# Patient Record
Sex: MALE | Race: WHITE | Employment: FULL TIME | URBAN - METROPOLITAN AREA
[De-identification: names, ages, dates, MRNs, and addresses within clinical notes are randomized per-mention and may not be internally consistent; named-entity substitution may affect disease eponyms.]

---

## 2017-12-11 PROBLEM — K62.5 RECTAL BLEEDING: Status: ACTIVE | Noted: 2017-12-11

## 2017-12-13 PROBLEM — M54.9 BACK PAIN: Status: ACTIVE | Noted: 2017-12-13

## 2017-12-30 PROCEDURE — 83520 IMMUNOASSAY QUANT NOS NONAB: CPT | Performed by: SURGERY

## 2017-12-30 PROCEDURE — 86255 FLUORESCENT ANTIBODY SCREEN: CPT | Performed by: SURGERY

## 2017-12-30 PROCEDURE — 86256 FLUORESCENT ANTIBODY TITER: CPT | Performed by: SURGERY

## 2017-12-30 PROCEDURE — 36415 COLL VENOUS BLD VENIPUNCTURE: CPT | Performed by: SURGERY

## 2018-01-09 ENCOUNTER — OFFICE VISIT (OUTPATIENT)
Dept: PHYSICAL THERAPY | Age: 43
End: 2018-01-09
Attending: PHYSICAL MEDICINE & REHABILITATION
Payer: OTHER MISCELLANEOUS

## 2018-01-09 DIAGNOSIS — M54.50 LOW BACK PAIN AT MULTIPLE SITES: ICD-10-CM

## 2018-01-09 PROCEDURE — 97161 PT EVAL LOW COMPLEX 20 MIN: CPT | Performed by: PHYSICAL THERAPIST

## 2018-01-09 PROCEDURE — 97530 THERAPEUTIC ACTIVITIES: CPT | Performed by: PHYSICAL THERAPIST

## 2018-01-09 NOTE — PROGRESS NOTES
SPINE EVALUATION:   Referring Physician: Dr. Dada Lundberg  Diagnosis: low back pain     Date of Service: 1/9/2018     PATIENT Max Lynne is a 40year old y/o male who presents to therapy today with complaints of low back pain s/p injury when work the above impairments for return to prior level of functioning without limitations. Thank you for referring Deedee Son to [id number].     Precautions:  None  OBJECTIVE:   Observation/Posture: Decreased lumbar lordosis in standing  Gait: Decreased ar visits)  · Pt will improve lumbar spine AROM flexion to min limitations to allow increase ease with bending forward to don shoes (8 visits)  · Pt will report improved symptom centralization and absence of radicular symptoms for 3 consecutive days to improv

## 2018-01-11 ENCOUNTER — OFFICE VISIT (OUTPATIENT)
Dept: PHYSICAL THERAPY | Age: 43
End: 2018-01-11
Attending: PHYSICAL MEDICINE & REHABILITATION
Payer: OTHER MISCELLANEOUS

## 2018-01-11 PROCEDURE — 97530 THERAPEUTIC ACTIVITIES: CPT | Performed by: PHYSICAL THERAPIST

## 2018-01-11 PROCEDURE — 97112 NEUROMUSCULAR REEDUCATION: CPT | Performed by: PHYSICAL THERAPIST

## 2018-01-11 PROCEDURE — 97140 MANUAL THERAPY 1/> REGIONS: CPT | Performed by: PHYSICAL THERAPIST

## 2018-01-11 NOTE — PROGRESS NOTES
Dx: low back pain         Authorized # of Visits:  8         Next MD visit: none scheduled  Fall Risk: standard         Precautions: n/a             Subjective: Pt states he felt ok after PT but had a lot more sharp shooting pain in L post thigh when arlin Supine TA brace X 10         Knee fall out with TA brace X 10         Pelvic tilts to assume neutral spine         Man there: STM to lumbar paraspinals X 10 min         Pt, , education on POC X 15 min                                        Ch

## 2018-01-16 ENCOUNTER — OFFICE VISIT (OUTPATIENT)
Dept: PHYSICAL THERAPY | Age: 43
End: 2018-01-16
Attending: PHYSICAL MEDICINE & REHABILITATION
Payer: OTHER MISCELLANEOUS

## 2018-01-16 PROCEDURE — 97110 THERAPEUTIC EXERCISES: CPT | Performed by: PHYSICAL THERAPIST

## 2018-01-16 PROCEDURE — 97140 MANUAL THERAPY 1/> REGIONS: CPT | Performed by: PHYSICAL THERAPIST

## 2018-01-16 NOTE — PROGRESS NOTES
Dx: low back pain         Authorized # of Visits:  8         Next MD visit: none scheduled  Fall Risk: standard         Precautions: n/a             Subjective: Pt states he has not has significant sharp pains since last visit.  However, pt reports waking u assume neutral spine Seated SB pelvic tilts to assume neutral spine        Man there: STM to lumbar paraspinals X 10 min Man there: sidelying lumbar gapping Gr IV, V, central PA glides, STM to lumbar paraspinals X 10 min        Pt, , education

## 2018-01-18 ENCOUNTER — OFFICE VISIT (OUTPATIENT)
Dept: PHYSICAL THERAPY | Age: 43
End: 2018-01-18
Attending: PHYSICAL MEDICINE & REHABILITATION
Payer: OTHER MISCELLANEOUS

## 2018-01-18 PROBLEM — K51.211 ULCERATIVE PROCTITIS WITH RECTAL BLEEDING (HCC): Status: ACTIVE | Noted: 2018-01-18

## 2018-01-18 PROCEDURE — 97140 MANUAL THERAPY 1/> REGIONS: CPT | Performed by: PHYSICAL THERAPIST

## 2018-01-18 PROCEDURE — 97110 THERAPEUTIC EXERCISES: CPT | Performed by: PHYSICAL THERAPIST

## 2018-01-18 NOTE — PROGRESS NOTES
Dx: low back pain         Authorized # of Visits:  8         Next MD visit: none scheduled  Fall Risk: standard         Precautions: n/a             Subjective: Pt states he has been having more good than bad days in the last month.  However, pt was sore af board stretch 30 sec X 3       Knee fall out with TA brace X 10 Dynamic HS stretch neutral, IR, ER 5 sec X 5 Knee fall out with TA bracing 3 X 10 alternate       Pelvic tilts to assume neutral spine Seated SB pelvic tilts to assume neutral spine Pelvic til

## 2018-01-23 ENCOUNTER — OFFICE VISIT (OUTPATIENT)
Dept: PHYSICAL THERAPY | Age: 43
End: 2018-01-23
Attending: PHYSICAL MEDICINE & REHABILITATION
Payer: OTHER MISCELLANEOUS

## 2018-01-23 PROCEDURE — 97140 MANUAL THERAPY 1/> REGIONS: CPT | Performed by: PHYSICAL THERAPIST

## 2018-01-23 PROCEDURE — 97530 THERAPEUTIC ACTIVITIES: CPT | Performed by: PHYSICAL THERAPIST

## 2018-01-23 PROCEDURE — 97110 THERAPEUTIC EXERCISES: CPT | Performed by: PHYSICAL THERAPIST

## 2018-01-23 NOTE — PROGRESS NOTES
Dx: low back pain         Authorized # of Visits:  8         Next MD visit: none scheduled  Fall Risk: standard         Precautions: n/a            ProgressSummary    Pt has attended 5 visits in Physical Therapy.      Subjective: Mr. Stroud Kaleigh reports improvemen lower abdominal recruitment to perform proper isometric contraction without requiring verbal or tactile cuing to promote advancement of therex (8 visits) MET (1/18/2018)  · Pt will demonstrate good understanding of proper posture and body mechanics to decr Objective:  Assessment:  Goals:   · Pt will improve lower abdominal recruitment to perform proper isometric contraction without requiring verbal or tactile cuing to promote advancement of therex (8 visits) MET (1/18/2018)  · Pt will demonstrate good unde perturbations with blue superband for stabilization exercise X 3' Prone press up X 10 Slant board stretch 30 sec X 3       Cable column 72# resisted walking F, B X 10 ea, R/L X 5 ea Supine SLR nerve glides X 20                    Charges:  There ex X 1, the

## 2018-01-25 ENCOUNTER — OFFICE VISIT (OUTPATIENT)
Dept: PHYSICAL THERAPY | Age: 43
End: 2018-01-25
Attending: PHYSICAL MEDICINE & REHABILITATION
Payer: OTHER MISCELLANEOUS

## 2018-01-25 PROCEDURE — 97110 THERAPEUTIC EXERCISES: CPT | Performed by: PHYSICAL THERAPIST

## 2018-01-25 PROCEDURE — 97140 MANUAL THERAPY 1/> REGIONS: CPT | Performed by: PHYSICAL THERAPIST

## 2018-01-25 NOTE — PROGRESS NOTES
Dx: low back pain         Authorized # of Visits:  8         Next MD visit: none scheduled  Fall Risk: standard         Precautions: n/a             Subjective: Pt reports being sore upto 6/10 yesterday stating he had a hard time sleeping.  However, pt is u stretch neutral, IR, ER 5 sec X 5 Knee fall out with TA bracing 3 X 10 alternate Reassessment Heel sitting lumbar stretch in midline, diagonal 10 sec X 3 ea     Pelvic tilts to assume neutral spine Seated SB pelvic tilts to assume neutral spine Pelvic tilt

## 2018-01-31 ENCOUNTER — OFFICE VISIT (OUTPATIENT)
Dept: PHYSICAL THERAPY | Age: 43
End: 2018-01-31
Attending: PHYSICAL MEDICINE & REHABILITATION
Payer: OTHER MISCELLANEOUS

## 2018-01-31 PROCEDURE — 97530 THERAPEUTIC ACTIVITIES: CPT | Performed by: PHYSICAL THERAPIST

## 2018-01-31 PROCEDURE — 97110 THERAPEUTIC EXERCISES: CPT | Performed by: PHYSICAL THERAPIST

## 2018-01-31 NOTE — PROGRESS NOTES
Dx: low back pain         Authorized # of Visits:  8         Next MD visit: none scheduled  Fall Risk: standard         Precautions: n/a             Subjective: Pt reports good days and bad days but still has burning pain along L post thigh up to knee.  Pt spine.  Date: 1/11/2018 TX#: 2/8 Date: 1/16/2018  TX#: 3/8   Date: 1/18/2018  TX#: 4/8 Date: 1/23/2018  TX#: 5/8 Date: 1/25/2018  TX#: 6/8 Date: 1/31/2018  TX#: 7/8 Date:               TX#: 8/   Recumbent bike X 5 min Recumbent bike X 5 min Recumbent bike glides X 20  Quadruped neutral spine X 10                  Charges:  There ex X 2, there act X 2     Total Timed Treatment: 55 min  Total Treatment Time: 55 min

## 2018-02-01 ENCOUNTER — APPOINTMENT (OUTPATIENT)
Dept: PHYSICAL THERAPY | Age: 43
End: 2018-02-01
Attending: PHYSICAL MEDICINE & REHABILITATION
Payer: OTHER MISCELLANEOUS

## 2018-02-06 ENCOUNTER — OFFICE VISIT (OUTPATIENT)
Dept: PHYSICAL THERAPY | Age: 43
End: 2018-02-06
Attending: PHYSICAL MEDICINE & REHABILITATION
Payer: OTHER MISCELLANEOUS

## 2018-02-06 PROBLEM — I47.1 PSVT (PAROXYSMAL SUPRAVENTRICULAR TACHYCARDIA) (HCC): Status: ACTIVE | Noted: 2018-02-06

## 2018-02-06 PROCEDURE — 97110 THERAPEUTIC EXERCISES: CPT | Performed by: PHYSICAL THERAPIST

## 2018-02-06 PROCEDURE — 97530 THERAPEUTIC ACTIVITIES: CPT | Performed by: PHYSICAL THERAPIST

## 2018-02-06 NOTE — PROGRESS NOTES
Dx: low back pain         Authorized # of Visits:  8         Next MD visit: none scheduled  Fall Risk: standard         Precautions: n/a            Progress Summary    Pt has attended 8 visits in Physical Therapy.      Subjective: Mr. Kenneth Mario has attended 3 se lordosis at rest but is now able to identify mid range at rest. However, pt continues to require tactile cueing for maintaining neutral spine with functional activities without end range pelvic tilt.     Goals:   · Pt will improve lower abdominal recruitmen furnished under this plan of treatment and while under my care.     X___________________________________________________ Date____________________    Certification From: 3/5/9054  To:5/7/2018      Subjective:  Objective:   Assessment:  Goals:   · Pt will imp spine Pain neuroscience education X 10 min Dynamic HS stretch neutral, IR, ER 5 sec X 5 Long sitting trunk flexion X 3 min Hip hinging with dowel X 10   Man there: STM to lumbar paraspinals X 10 min Man there: sidelying lumbar gapping Gr IV, V, central PA

## 2018-05-23 ENCOUNTER — HOSPITAL (OUTPATIENT)
Dept: OTHER | Age: 43
End: 2018-05-23
Attending: RADIOLOGY

## 2019-05-07 ENCOUNTER — APPOINTMENT (OUTPATIENT)
Dept: OTHER | Facility: HOSPITAL | Age: 44
End: 2019-05-07
Attending: PREVENTIVE MEDICINE

## 2023-08-24 ENCOUNTER — APPOINTMENT (OUTPATIENT)
Dept: OTHER | Facility: HOSPITAL | Age: 48
End: 2023-08-24
Attending: PREVENTIVE MEDICINE

## (undated) NOTE — LETTER
Patient Name: Guido Baires  YOB: 1975          MRN number:  [id number]  Date:  1/23/2018  Referring Physician: Dr. Carson Hand     ProgressSummary    Pt has attended 5 visits in Physical Therapy.      Subjective: Nakita Gregg Bondsa reports improvemen · Pt will improve lower abdominal recruitment to perform proper isometric contraction without requiring verbal or tactile cuing to promote advancement of therex (8 visits) MET (1/18/2018)  · Pt will demonstrate good understanding of proper posture and body

## (undated) NOTE — LETTER
Patient Name: Angela Gonzalez  YOB: 1975          MRN number:  [id number]  Date:  2/6/2018  Referring Physician:  Dr. Elizabeth Avila Summary    Pt has attended 8 visits in Physical Therapy.      Subjective: Mr. Shaji Nye has attended 3 se lordosis at rest but is now able to identify mid range at rest. However, pt continues to require tactile cueing for maintaining neutral spine with functional activities without end range pelvic tilt.     Goals:   · Pt will improve lower abdominal recruitmen I certify the need for these services furnished under this plan of treatment and while under my care.     X___________________________________________________ Date____________________    Certification From: 1/4/4855  To:5/7/2018